# Patient Record
(demographics unavailable — no encounter records)

---

## 2025-04-18 NOTE — HISTORY OF PRESENT ILLNESS
[FreeTextEntry1] : Mohs surgery for SCC L superior upper back  [de-identified] : 04/18/2025 Mohs surgery for SCC L superior upper back    Referred by: Dr. Rinaldi We had the pleasure of seeing your patient for Mohs Micrographic Surgery.   Ms. HERIBERTO WILHELM is a 77 year old F who presents for Mohs Micrographic Surgery for a SCC L superior upper back. Spot popped up rapidly before skin check and was tender.    Skin cancer history: NMSC s/p MMS on the face x 3  PMH: None    Pertinent positives noted below History of HIV or hepatitis: No Blood thinners: None  Antibiotic Prophylaxis: None Medical implants: None Allergies: Calcipotriene    The patient's review of systems questionnaire was reviewed. Education needs were identified. There were no barriers to learning.

## 2025-04-18 NOTE — HISTORY OF PRESENT ILLNESS
[FreeTextEntry1] : Mohs surgery for SCC L superior upper back  [de-identified] : 04/18/2025 Mohs surgery for SCC L superior upper back    Referred by: Dr. Rinaldi We had the pleasure of seeing your patient for Mohs Micrographic Surgery.   Ms. HERIBERTO WILHELM is a 77 year old F who presents for Mohs Micrographic Surgery for a SCC L superior upper back. Spot popped up rapidly before skin check and was tender.    Skin cancer history: NMSC s/p MMS on the face x 3  PMH: None    Pertinent positives noted below History of HIV or hepatitis: No Blood thinners: None  Antibiotic Prophylaxis: None Medical implants: None Allergies: Calcipotriene    The patient's review of systems questionnaire was reviewed. Education needs were identified. There were no barriers to learning.

## 2025-04-18 NOTE — PROCEDURE
[TextEntry] :  A surgical time out was taken to confirm the patient's correct identity and the correct site. The operative site was identified by the patient and surgical team prior to surgery and the patient agreed to proceed with the surgical site which was marked prior to anesthesia infiltration.   Mohs Micrographic Surgery Report Date Collected: 04/18/2025 Date Received: Same as above Date Verified: Same as above Attending Surgeon: Delmy Haines MD Fellow: Mary Goodman MD Resident: Mi Mcguire MD Procedure#: [id number] Diagnosis: Location:   Pre-op Size: cm Post-Operative Final Defect Size: cm Stages (number of pieces per stage: (pieces/stage)): 1 (1/1) Pathology, if tumor noted in stages: Stage 1: Sparse perivascular lymphocytic infiltrate *** Indications for procedure: Ill-defined tumor margins, high-priority anatomic location for preservation of normal tissue, aggressive histologic nature of the tumor Repair type: Subcutaneous and dermal sutures used: Monocryl Vicryl Epidermal sutures: Prolene FAST acting gut Total volume of anesthesia: mL Repair size or area: cm   Mohs Procedure Report: The patient was escorted to the outpatient surgical operatory. The proposed Mohs procedure and reconstruction options were discussed with the patient. The risks, benefits, and alternatives were discussed and the patient signed a written consent form. A time out was taken to confirm the patient's identity and the exact location of the skin cancer. After the patient was placed in a recumbent position, the surgical site was cleaned with alcohol and either Betadine (for eyes and ears) or chlorhexidine gluconate, draped, and infiltrated with 1% buffered lidocaine with 1:100,000 epinephrine local anesthetic. A sterile surgical marking pen was used to outline a thin margin of normal-appearing skin around the tumor. A beveled incision was then made using a scalpel. Small orienting nicks were made on the specimen and the surrounding skin. The tissue was then sharply dissected from the surrounding skin. Hemostasis was maintained with the electrosurgical unit and/or pressure. A temporary dressing was placed on the surgical defect until the frozen section slides were interpreted. The oriented specimen was placed in a Licha dish and transported to the Mohs laboratory. For each stage of the procedure, a visual representation of the specimen was drawn on a Mohs map. This map graphically depicts the specimen's two-dimensional appearance, orientation, and tissue preparation, which consists of dividing the specimen and applying tissue dyes. Because the deep and peripheral portions of the tissue are then embedded in the same geometric plane, the map also represents an oriented scale drawing of the resulting histologic sections. The Mohs technician then prepared frozen section slides using standard techniques. The slides were stained with hematoxylin and eosin, and cover slips were applied. The frozen section slides were then examined under the microscope. If tumor was found, it was localized on the map. The orienting nicks on the original specimen corresponded to similar nicks on the surgical defect so areas of identified tumor could be mapped back to the patient and resected. Additional layers of removed skin were then processed as indicated above. This iterative process continued as applicable until no tumor was observed microscopically. At this stage, the Mohs resection was complete.

## 2025-05-01 NOTE — HISTORY OF PRESENT ILLNESS
[FreeTextEntry1] : Wound Check s/p Mohs surgery for Invasive SCC Left superior upper back 4/18/25, concern for wound infection [de-identified] : 4/29/2025   Referred by: Dr. Rinaldi  Ms. HERIBERTO WILHELM is a 77 year old F who presents for follow up s/p Mohs as above. In the last several days has noted redness, swelling and tenderness of the wound. No fever or chills.   Patient denies fever, chills, night sweats, unintentional weight loss or other constitutional symptoms

## 2025-05-01 NOTE — ASSESSMENT
[FreeTextEntry1] : Wound Check s/p Mohs surgery for a SCC Left superior upper back, Concern for wound infection -- 1 stage(s) of Mohs surgery performed 04/18/2025 -- intermediate linear layered linear repair performed -- concerned for wound infection today vs. possible stable hematoma -START doxycycline 100 mg BID x 10 days, SED -follow up in 1 week or sooner if needed -- f/u for routine skin exams as previously recommended by Her referring dermatologist.   Thank you for this Mohs surgery referral.   Delmy Haines MD Physician, Dermatology & Dermatologic Surgery [facility]       
6.5

## 2025-05-01 NOTE — PHYSICAL EXAM
[Alert] : alert [Oriented x 3] : ~L oriented x 3 [Well Nourished] : well nourished [Conjunctiva Non-injected] : conjunctiva non-injected [No Visual Lymphadenopathy] : no visual  lymphadenopathy [No Clubbing] : no clubbing [No Edema] : no edema [No Bromhidrosis] : no bromhidrosis [No Chromhidrosis] : no chromhidrosis [Hair] : Hair [Scalp] : Scalp [Face] : Face [Nose] : Nose [Eyelids] : Eyelids [Ears] : Ears [Lips] : Lips [Neck] : Neck [Nodes] : Nodes [FreeTextEntry3] : Left upper back w/edematous erythematous tender incision, firm to the touch

## 2025-05-14 NOTE — PHYSICAL EXAM
[Alert] : alert [Oriented x 3] : ~L oriented x 3 [Well Nourished] : well nourished [Conjunctiva Non-injected] : conjunctiva non-injected [No Visual Lymphadenopathy] : no visual  lymphadenopathy [No Clubbing] : no clubbing [No Edema] : no edema [No Bromhidrosis] : no bromhidrosis [No Chromhidrosis] : no chromhidrosis [Hair] : Hair [Scalp] : Scalp [Face] : Face [Nose] : Nose [Eyelids] : Eyelids [Ears] : Ears [Lips] : Lips [Neck] : Neck [Nodes] : Nodes [FreeTextEntry3] : Left upper back w/resolving erythema around incision, some resiual redness around inferior aspect and swelling

## 2025-05-14 NOTE — ASSESSMENT
[FreeTextEntry1] : Wound Check s/p Mohs surgery for a SCC Left superior upper back, Favor resolvingr wound infection and resolving stable hematoma -- 1 stage(s) of Mohs surgery performed 04/18/2025 -- intermediate linear layered linear repair performed -- much improved s/p Doxycycline 100 mg BID x 14 days, resolving erythema favoring stable hematoma at inferior end -RTC PRN, notify us if worsened -- f/u for routine skin exams as previously recommended by Her referring dermatologist.   Thank you for this Mohs surgery referral.   Delmy Haines MD Physician, Dermatology & Dermatologic Surgery [facility]

## 2025-05-14 NOTE — HISTORY OF PRESENT ILLNESS
[FreeTextEntry1] : Wound Check s/p Mohs surgery for Invasive SCC Left superior upper back 4/18/25, with resolvinf inflammation due to suspected wound infection/hematoma [de-identified] : 5/13/2025    Ms. HERIBERTO WILHELM is a 77 year old F who presents for follow up s/p Mohs as above. About 10 days after surgery she noted redness, swelling and tenderness of the wound. No fever or chills. Completed Doxycycline 100 mg BID x 14 days and significantly improved. Still a bit red and tender at the inferior end but getting better gradually. No fever or chills.   Patient denies fever, chills, night sweats, unintentional weight loss or other constitutional symptoms